# Patient Record
Sex: FEMALE | Race: WHITE | Employment: OTHER | URBAN - METROPOLITAN AREA
[De-identification: names, ages, dates, MRNs, and addresses within clinical notes are randomized per-mention and may not be internally consistent; named-entity substitution may affect disease eponyms.]

---

## 2021-08-11 ENCOUNTER — HOSPITAL ENCOUNTER (EMERGENCY)
Age: 56
Discharge: HOME | End: 2021-08-11

## 2021-08-11 ENCOUNTER — APPOINTMENT (OUTPATIENT)
Dept: GENERAL RADIOLOGY | Age: 56
End: 2021-08-11

## 2021-08-11 VITALS
RESPIRATION RATE: 16 BRPM | DIASTOLIC BLOOD PRESSURE: 91 MMHG | HEART RATE: 62 BPM | WEIGHT: 155 LBS | TEMPERATURE: 97.2 F | OXYGEN SATURATION: 98 % | SYSTOLIC BLOOD PRESSURE: 195 MMHG | HEIGHT: 62 IN | BODY MASS INDEX: 28.52 KG/M2

## 2021-08-11 DIAGNOSIS — S52.502A CLOSED FRACTURE OF DISTAL END OF LEFT RADIUS, UNSPECIFIED FRACTURE MORPHOLOGY, INITIAL ENCOUNTER: Primary | ICD-10-CM

## 2021-08-11 PROCEDURE — 99283 EMERGENCY DEPT VISIT LOW MDM: CPT

## 2021-08-11 PROCEDURE — 73110 X-RAY EXAM OF WRIST: CPT

## 2021-08-11 PROCEDURE — 29125 APPL SHORT ARM SPLINT STATIC: CPT

## 2021-08-11 ASSESSMENT — PAIN DESCRIPTION - ORIENTATION: ORIENTATION: LEFT

## 2021-08-11 ASSESSMENT — PAIN DESCRIPTION - LOCATION: LOCATION: WRIST

## 2021-08-11 ASSESSMENT — PAIN SCALES - GENERAL: PAINLEVEL_OUTOF10: 7

## 2021-08-16 ENCOUNTER — OFFICE VISIT (OUTPATIENT)
Dept: ORTHOPEDIC SURGERY | Age: 56
End: 2021-08-16

## 2021-08-16 VITALS — BODY MASS INDEX: 31.28 KG/M2 | TEMPERATURE: 98 F | WEIGHT: 170 LBS | HEIGHT: 62 IN

## 2021-08-16 DIAGNOSIS — S52.502A CLOSED FRACTURE OF DISTAL END OF LEFT RADIUS, UNSPECIFIED FRACTURE MORPHOLOGY, INITIAL ENCOUNTER: Primary | ICD-10-CM

## 2021-08-16 PROCEDURE — 99203 OFFICE O/P NEW LOW 30 MIN: CPT | Performed by: ORTHOPAEDIC SURGERY

## 2021-08-16 RX ORDER — CITALOPRAM 20 MG/1
TABLET ORAL
COMMUNITY
Start: 2021-07-09

## 2021-08-16 NOTE — PROGRESS NOTES
Emmie Navas is a 54 y.o. female, who presents   Chief Complaint   Patient presents with    Wrist Pain     left wrist DOI 8/11/21 wind ken zazueta and knocked her over       HPI[de-identified] Injury occurred 5 days ago and a fall. She fell to her left side and is unable to recall exact positioning. She ended up with a painful injury to her left wrist.  She went to [facility] ER where evaluation included x-rays which showed a fracture of the distal distal left radius. She was placed in a volar fiberglass splint. Allergies; medications; past medical, surgical, family, and social history; and problem list have been reviewed today and updated as indicated in this encounter - see below following Ortho specifics. Musculoskeletal: Skin condition gross neurovascular function is good in left upper extremity. Shoulder and elbow range of motion stability are good without pain. The left wrist was edematous with some discoloration from bruising. has little stiffness in her fingers because of the swelling. Mobilization was avoided because of the known injury and pain. Radiologic Studies: Imaging studies in multiple views shows a closed comminuted fracture of the distal left radius with slight dorsal shortening and neutralization of the volar tilt to perpendicular to the long axis on the lateral film. The ulnar styloid appears intact    ASSESSMENT:  Fay Cohen was seen today for wrist pain. Diagnoses and all orders for this visit:    Closed fracture of distal end of left radius, unspecified fracture morphology, initial encounter     Treatment alternatives were reviewed including medical and physical therapies, injections, and surgical options, expected risks benefits and likely outcome of each were discussed in detail, questions asked and answered and understood. We discussed the injury as well as physical findings and imaging results. This could heal with a good functional result.   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 Surgical treatments not felt to be necessary here for good result. PLAN: We will place her in a heat molded brace and follow-up in 1 week and andrae-ray to check position alignment. There is no problem list on file for this patient. Past Medical History:   Diagnosis Date    Hypertension        History reviewed. No pertinent surgical history. Current Outpatient Medications   Medication Sig Dispense Refill    citalopram (CELEXA) 20 MG tablet TAKE ONE TABLET BY MOUTH DAILY       No current facility-administered medications for this visit. Allergies   Allergen Reactions    Sulfa Antibiotics Hives       Social History     Socioeconomic History    Marital status:      Spouse name: None    Number of children: None    Years of education: None    Highest education level: None   Occupational History    None   Tobacco Use    Smoking status: Never Smoker    Smokeless tobacco: Never Used   Substance and Sexual Activity    Alcohol use: None    Drug use: None    Sexual activity: None   Other Topics Concern    None   Social History Narrative    None     Social Determinants of Health     Financial Resource Strain:     Difficulty of Paying Living Expenses:    Food Insecurity:     Worried About Running Out of Food in the Last Year:     Ran Out of Food in the Last Year:    Transportation Needs:     Lack of Transportation (Medical):      Lack of Transportation (Non-Medical):    Physical Activity:     Days of Exercise per Week:     Minutes of Exercise per Session:    Stress:     Feeling of Stress :    Social Connections:     Frequency of Communication with Friends and Family:     Frequency of Social Gatherings with Friends and Family:     Attends Evangelical Services:     Active Member of Clubs or Organizations:     Attends Club or Organization Meetings:     Marital Status:    Intimate Partner Violence:     Fear of Current or Ex-Partner:     Emotionally Abused:     Physically Abused:     Sexually Abused:        History reviewed. No pertinent family history. Review of Systems:   As follows except as previously noted in HPI:  Constitutional: Negative for chills, diaphoresis,  fever   Respiratory: Negative for cough, shortness of breath and wheezing. Cardiovascular: Negative for chest pain and palpitations. Neurological: Negative for dizziness, syncope,   GI / : abdominal pain or cramping  Musculoskeletal: see HPI       Objective:   Physical Exam   Constitutional: Oriented to person, place, and time. and appears well-developed and well-nourished. :   Head: Normocephalic and atraumatic. Neck: Neck supple. Eyes: EOM are normal.   Pulmonary/Chest: Effort normal.  No respiratory distress, no wheezes. Neurological: Alert and oriented to person  Skin: Skin is warm and dry. Kuldip Conti DO    8/16/21  11:50 AM    All reasonable efforts have been made to minimize the risk of errors that may occur in the use of voice recognition and other electronic means of charting.

## 2021-08-20 DIAGNOSIS — S52.502A CLOSED FRACTURE OF DISTAL END OF LEFT RADIUS, UNSPECIFIED FRACTURE MORPHOLOGY, INITIAL ENCOUNTER: Primary | ICD-10-CM

## 2021-08-23 ENCOUNTER — OFFICE VISIT (OUTPATIENT)
Dept: ORTHOPEDIC SURGERY | Age: 56
End: 2021-08-23

## 2021-08-23 VITALS — HEIGHT: 62 IN | TEMPERATURE: 98 F | WEIGHT: 170 LBS | BODY MASS INDEX: 31.28 KG/M2

## 2021-08-23 DIAGNOSIS — S52.502A CLOSED FRACTURE OF DISTAL END OF LEFT RADIUS, UNSPECIFIED FRACTURE MORPHOLOGY, INITIAL ENCOUNTER: Primary | ICD-10-CM

## 2021-08-23 PROCEDURE — 99213 OFFICE O/P EST LOW 20 MIN: CPT | Performed by: ORTHOPAEDIC SURGERY

## 2021-08-23 NOTE — PROGRESS NOTES
Chief Complaint:   Chief Complaint   Patient presents with    Wrist Pain     left wrist pain f/u doesnt hurt to much unless using it to often, more crampy feeling       Wilder Izaguirre is 12 days post injury to her left wrist.  She is in a brace. She has tried to clean it up a little bit. She notices most discomfort with trying to rotate her wrist and hand. 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 Allergies; medications; past medical, surgical, family, and social history; and problem list have been reviewed today and updated as indicated in this encounter seen below. Exam: Finger range of motion is good. She has most discomfort and difficulty with supination. This is not unexpected with this injury. Elbow range of motion is good. There is a small resolving bruise in the lateral elbow area. Radiographs: Imaging and 3 views of the left wrist shows a stable fracture pattern of the distal left radius with maintained alignment. The distal articular surface about perpendicular to the long axis on the lateral films. There is minimal shortening overall. ASSESSMENT:    There are no diagnoses linked to this encounter. PLAN: Continue with the brace. Clean instructions were given to [facility]. We will follow-up in 4 weeks and x-ray the wrist once again. No follow-ups on file. Current Outpatient Medications   Medication Sig Dispense Refill    citalopram (CELEXA) 20 MG tablet TAKE ONE TABLET BY MOUTH DAILY       No current facility-administered medications for this visit. There is no problem list on file for this patient. Past Medical History:   Diagnosis Date    Hypertension        No past surgical history on file.     Allergies   Allergen Reactions    Sulfa Antibiotics Hives       Social History     Socioeconomic History    Marital status:      Spouse name: Not on file    Number of children: Not on file    Years of education: Not on file    Highest education level: Not on file   Occupational History    Not on file   Tobacco Use    Smoking status: Never Smoker    Smokeless tobacco: Never Used   Substance and Sexual Activity    Alcohol use: Not on file    Drug use: Not on file    Sexual activity: Not on file   Other Topics Concern    Not on file   Social History Narrative    Not on file     Social Determinants of Health     Financial Resource Strain:     Difficulty of Paying Living Expenses:    Food Insecurity:     Worried About Running Out of Food in the Last Year:     [address] in the Last Year:    Transportation Needs:     Lack of Transportation (Medical):  Lack of Transportation (Non-Medical):    Physical Activity:     Days of Exercise per Week:     Minutes of Exercise per Session:    Stress:     Feeling of Stress :    Social Connections:     Frequency of Communication with Friends and Family:     Frequency of Social Gatherings with Friends and Family:     Attends Confucianist Services:     Active Member of Clubs or Organizations:     Attends Club or Organization Meetings:     Marital Status:    Intimate Partner Violence:     Fear of Current or Ex-Partner:     Emotionally Abused:     Physically Abused:     Sexually Abused:        Review of Systems  As follows except as previously noted in HPI:  Constitutional: Negative for chills, diaphoresis, fatigue, fever and unexpected weight change. Respiratory: Negative for cough, shortness of breath and wheezing. Cardiovascular: Negative for chest pain and palpitations. Neurological: Negative for dizziness, syncope, cephalgia. GI / : negative  Musculoskeletal: see HPI       Objective:   Physical Exam   Constitutional: Oriented to person, place, and time. and appears well-developed and well-nourished. :   Head: Normocephalic and atraumatic. Eyes: EOM are normal.   Neck: Neck supple. Cardiovascular: Normal rate and regular rhythm.     Pulmonary/Chest: Effort normal. No stridor. No respiratory distress, no wheezes. Abdominal:  No abnormal distension. Neurological: Alert and oriented to person, place, and time. Skin: Skin is warm and dry. Psychiatric: Normal mood and affect.  Behavior is normal. Thought content normal.    BLANCA Nixon,     8/23/21  12:00 PM

## 2021-09-17 DIAGNOSIS — S52.502A CLOSED FRACTURE OF DISTAL END OF LEFT RADIUS, UNSPECIFIED FRACTURE MORPHOLOGY, INITIAL ENCOUNTER: Primary | ICD-10-CM

## 2021-09-20 ENCOUNTER — OFFICE VISIT (OUTPATIENT)
Dept: ORTHOPEDIC SURGERY | Age: 56
End: 2021-09-20

## 2021-09-20 VITALS — WEIGHT: 170 LBS | TEMPERATURE: 98 F | BODY MASS INDEX: 31.28 KG/M2 | HEIGHT: 62 IN

## 2021-09-20 DIAGNOSIS — S52.502A CLOSED FRACTURE OF DISTAL END OF LEFT RADIUS, UNSPECIFIED FRACTURE MORPHOLOGY, INITIAL ENCOUNTER: Primary | ICD-10-CM

## 2021-09-20 PROCEDURE — 99213 OFFICE O/P EST LOW 20 MIN: CPT | Performed by: ORTHOPAEDIC SURGERY

## 2021-09-20 RX ORDER — VALACYCLOVIR HYDROCHLORIDE 500 MG/1
TABLET, FILM COATED ORAL
COMMUNITY
Start: 2021-09-05

## 2021-09-20 NOTE — PROGRESS NOTES
Chief Complaint:   Chief Complaint   Patient presents with    Wrist Pain     Left wrist pain follow up. Wrist is feeling better then previous visit. Some movements still bothering her. Yrn Alarcon is 6 weeks post injury to her left wrist.  She has been wearing her brace. She has some discomfort and limitations of motion. She has been moving a little bit. She is taken the brace off some. She has been working with the brace on she also sleeps with the brace on. Allergies; medications; past medical, surgical, family, and social history; and problem list have been reviewed today and updated as indicated in this encounter seen below. Exam: There is a slight amount prominence of the ulnar styloid left wrist.  There is some discomfort with range of motion which is quite limited in all planes. She is pliable when she relaxes. Radiographs: Imaging of the left wrist and 3 views shows decreased fracture distinction still suggesting progressive healing through the area. There is little widening of the distal radial ulnar joint. ASSESSMENT:    Florence Krishna was seen today for wrist pain. Diagnoses and all orders for this visit:    Closed fracture of distal end of left radius, unspecified fracture morphology, initial encounter  -     [facility]        PLAN: We will schedule physical therapy/occupational therapy to work on range of motion and function. She should gradually wean herself out of the brace. We will follow-up in about 3 weeks. Return in about 3 weeks (around 10/11/2021). Current Outpatient Medications   Medication Sig Dispense Refill    valACYclovir (VALTREX) 500 MG tablet TAKE ONE TABLET BY MOUTH DAILY      citalopram (CELEXA) 20 MG tablet TAKE ONE TABLET BY MOUTH DAILY       No current facility-administered medications for this visit. There is no problem list on file for this patient.       Past Medical History:   Diagnosis Date    person, place, and time. and appears well-developed and well-nourished. :   Head: Normocephalic and atraumatic. Eyes: EOM are normal.   Neck: Neck supple. Cardiovascular: Normal rate and regular rhythm. Pulmonary/Chest: Effort normal. No stridor. No respiratory distress, no wheezes. Abdominal:  No abnormal distension. Neurological: Alert and oriented to person, place, and time. Skin: Skin is warm and dry. Psychiatric: Normal mood and affect.  Behavior is normal. Thought content normal.    BLANCA Osorio DO    9/20/21  12:00 PM

## 2021-10-11 ENCOUNTER — OFFICE VISIT (OUTPATIENT)
Dept: ORTHOPEDIC SURGERY | Age: 56
End: 2021-10-11

## 2021-10-11 VITALS — WEIGHT: 170 LBS | BODY MASS INDEX: 31.28 KG/M2 | TEMPERATURE: 98 F | HEIGHT: 62 IN

## 2021-10-11 DIAGNOSIS — S52.502A CLOSED FRACTURE OF DISTAL END OF LEFT RADIUS, UNSPECIFIED FRACTURE MORPHOLOGY, INITIAL ENCOUNTER: Primary | ICD-10-CM

## 2021-10-11 PROCEDURE — 99213 OFFICE O/P EST LOW 20 MIN: CPT | Performed by: ORTHOPAEDIC SURGERY

## 2021-10-11 NOTE — PROGRESS NOTES
Antibiotics Hives       Social History     Socioeconomic History    Marital status:      Spouse name: None    Number of children: None    Years of education: None    Highest education level: None   Occupational History    None   Tobacco Use    Smoking status: Never Smoker    Smokeless tobacco: Never Used   Substance and Sexual Activity    Alcohol use: None    Drug use: None    Sexual activity: None   Other Topics Concern    None   Social History Narrative    None     Social Determinants of Health     Financial Resource Strain:     Difficulty of Paying Living Expenses:    Food Insecurity:     Worried About Running Out of Food in the Last Year:     Ran Out of Food in the Last Year:    Transportation Needs:     Lack of Transportation (Medical):  Lack of Transportation (Non-Medical):    Physical Activity:     Days of Exercise per Week:     Minutes of Exercise per Session:    Stress:     Feeling of Stress :    Social Connections:     Frequency of Communication with Friends and Family:     Frequency of Social Gatherings with Friends and Family:     Attends Episcopal Services:     Active Member of Clubs or Organizations:     Attends Club or Organization Meetings:     Marital Status:    Intimate Partner Violence:     Fear of Current or Ex-Partner:     Emotionally Abused:     Physically Abused:     Sexually Abused:        Review of Systems  As follows except as previously noted in HPI:  Constitutional: Negative for chills, diaphoresis, fatigue, fever and unexpected weight change. Respiratory: Negative for cough, shortness of breath and wheezing. Cardiovascular: Negative for chest pain and palpitations. Neurological: Negative for dizziness, syncope, cephalgia. GI / : negative  Musculoskeletal: see HPI       Objective:   Physical Exam   Constitutional: Oriented to person, place, and time. and appears well-developed and well-nourished. :   Head: Normocephalic and atraumatic. Eyes: EOM are normal.   Neck: Neck supple. Cardiovascular: Normal rate and regular rhythm. Pulmonary/Chest: Effort normal. No stridor. No respiratory distress, no wheezes. Abdominal:  No abnormal distension. Neurological: Alert and oriented to person, place, and time. Skin: Skin is warm and dry. Psychiatric: Normal mood and affect.  Behavior is normal. Thought content normal.    BLANCA Vann DO    10/11/21  11:29 AM

## 2021-10-27 ENCOUNTER — EVALUATION (OUTPATIENT)
Dept: OCCUPATIONAL THERAPY | Age: 56
End: 2021-10-27

## 2021-10-27 DIAGNOSIS — S52.502A CLOSED FRACTURE OF DISTAL END OF LEFT RADIUS, UNSPECIFIED FRACTURE MORPHOLOGY, INITIAL ENCOUNTER: Primary | ICD-10-CM

## 2021-10-27 PROCEDURE — 97165 OT EVAL LOW COMPLEX 30 MIN: CPT | Performed by: OCCUPATIONAL THERAPIST

## 2021-10-27 NOTE — PROGRESS NOTES
OCCUPATIONAL THERAPY INITIAL EVALUATION    [address] [facility]  [facility] OCCUPATIONAL THERAPY   [place] [address]  Dept: [id number] [address] [facility] Fax: [phone]    Date:  10/27/2021  Initial Evaluation Date: 10-27-21     Evaluating Therapist: Jenna Gagnon OT/L  [id number]    Patient Name:  Madison Key    :  1965    Restrictions/Precautions:   Activity as tolerated, Low fall risk  Diagnosis:  S52.502A (ICD-10-CM) - Closed fracture of distal end of left radius, unspecified fracture morphology, initial encounter       Date of Surgery/Injury: DOI 21/ no surgery required    Insurance/Certification information:  NA  Plan of care signed (Y/N): N  Visit# / total visits: 1 / up to 5 visits    Referring Practitioner:  Dr Jeremy Nichols Practitioner Orders: OT evaluate and treat    Assessment of current deficits   [] Functional mobility   [x] ADLs  [x] Strength   [] Cognition   [] Functional transfers   [x] IADLs  [] Safety Awareness  [] Endurance   [] Fine Motor Coordination  [] Balance  [] Vision/perception  [] Sensation    [] Gross Motor Coordination [x] ROM  [x] Pain   [] Edema    [] Scar Adhesion/Skin Integrity     OT PLAN OF CARE   OT POC based on physician orders, patient diagnosis and results of clinical assessment    Frequency/Xmgkyfle5n/ week x up to 5 weeks  Specific OT Treatment to include:     [x] Instruction in HEP                   Modalities:  [x] Therapeutic Exercise        [x] Ultrasound               [] Electrical Stimulation/Attended  [x] PROM/Stretching                    [x] Fluidotherapy          [x]  Paraffin                   [x] AAROM  [x] AROM                 [] Iontophoresis:   [] Tendon Glides                                               [] Neuromuscular Re-Ed            [] ADL/IADL re-training    [x] Therapeutic Activity       [x] Pain Management with/without modalities PRN                 [x] Manual Therapy                      [] Splinting                      [] Scar Management                   []Joint Protection/Training  []Ergonomics                             [] Joint Mobilization        [] Adaptive Equipment Assessment/Training                             [] Manual Edema Mobilization   [] Myofascial Release                 [] Energy Conservation/Work Simplification  [] GM/FM Coordination       [] Safety retraining/education per  individual diagnosis/goals  [] Desensitization       Patient Specific Goal: \" Do everything like normal\"                             GOALS (Long term same as Short term):  1) Patient will demonstrate good understanding of home program (exercises/activities/diagnosis/prognosis/goals) with good accuracy. 2) Patient will demonstrate increased active/passive range of motion of their left wrist to [facility] for ADL/IADL completion. 3) Patient will demonstrate increased /pinch strength of at least 10  in the left hand. 4) Patient to report decreased pain in their affected L distal upper extremity from 4/10 to 2/10 or less with resistive functional use. 5) Patient will report ADL functions as II using the left wrist/ hand more effectively. Past Medical History:   Past Medical History:   Diagnosis Date    Hypertension      Past Surgical History: No past surgical history on file. Reason for Referral: Pt presents with a Hx of left wrist injury from a fall on 8-11-21. Xrays showed acute fracture of the left distal radius. Conservative treatment was required with wearing a heat molded brace. Pt now presents for therapy with cc: of decreased wrist ROM, decreased tolerance for WB and decreased ability to lift.      Home Living: Lives alone  Prior Level of Function: independent    Cognition:   Alert/Oriented x3     IADL STATUS:   Ind Mod I Min A Mod A Max A Dep Other   Homemaking Responsibility  x        Shopping Responsibility:  x        Mode of Transportation: car x         Leisure & Hobbies: Work: [employer] x x     Difficulty shampooing     Comments: Pt is able to complete most light to moderate tasks with compensation and over use of the right arm. 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 Pt states she just pushes to do everything but states the wrist is stiff/ sore. ADL STATUS:   Ind Mod I Min A Mod A Max A Dep Other   Feeding: x         Grooming:  x        Bathing: x         UE Dressing: x         LE Dressing: x         Toileting: x         Transfers: x             Pain Level: 4 on scale of 1-10, aching, tight (pulling) and uncomfortable in the L radial wrist    UE Assessment: RHD    Left UE AROM: Exceptions to WNL    Eval (10-27-21)     Wrist flexion  0-40     Wrist extension  0-65      UD  0-25     Supination  0-55      Pronation  0-75             Sensation: L  Able To Sense (Y) / Unable to Sense (N)  SEMMES-KATIE Thumb 2nd Digit 3rd Digit  4th Digit  5th Digit    Normal Touch  Size: 2.83 x x x x x   Diminished Light Touch   Size: 3.61        Diminished Protective Sense  Size: 4.31        Loss of Protective Sense   Size: 4.56        Loss of Sensation  Size: 6.65          Dynamometer (setting 2):     Left: 33# average      Right: 59#    Pinch Meter:   Lateral: Left= 11#,Right= 13#      Coordination: WFL    QuickDASH Score: TBA    Intervention: Tx started today with a paraffin tx and followed with soft tissue mob to the wrist/ forearm. Wrist ROM started with attention to AROM and AAROM in all planes. The tightest areas are UD and wrist flexion. Pt is to continue ROM and light stretches at home. Plans are to progress ROM, stretches and strengthening with attention to HEP. Pt requests to limit sessions. Will advance as tolerated.       Eval Complexity: Low  Profile and History- interview, MD notes, xrays, ED notes  Assessment of Occupational Performance and Identification of Deficits- 5 performance deficits  Clinical Decision Making- no modifications in testing/ no co-morbiditiy    Rehab Potential:                                 [x] Good  [] Fair  [] Poor        Suggested Professional Referral:       [x] No  [] Yes:  Barriers to Goal Achievement[de-identified]          [x] No  [] Yes:  Domestic Concerns:                           [x] No  [] Yes:       Patient. Education:  [x] Plans/Goals, Risks/Benefits discussed  [x] Home exercise program  Method of Education: [x] Verbal  [x] Demo  [] Written  Comprehension of Education:  [x] Verbalizes understanding. [x] Demonstrates understanding. [] Needs Review. [] Demonstrates/verbalizes understanding of HEP/Ed previously given. Patient understands diagnosis/prognosis and consents to treatment, plan and goals:   [x] Yes    [] No       Time In: 1430          Time Out: 1530                      Timed Code Treatment Minutes: 60 minutes      CODE  Minutes  Units   31164 OT Eval Low 60 1   43295 OT Eval Medium     04569 OT Eval High     86555 Fluidotherapy     51601 Manual     04976 Therapeutic Ex     91472 Therapeutic Activity     73292 ADL/COMP Tech Train     78439 Neuromuscular Re-Ed     62924 OrthoManagementTraining     85687 Paraffin     73476 Electrical Stim - Attended     [id number] Iontophoresis     18484 Ultrasound      Other                Electronically signed by: Jarett Lagos OT/RAYMOND  [id number]      ANKITA Certification / Comments      Frequency/Duration 1x / week for up to 5 visits. Certification period From: 10-27-21  To: 1-27-22     I have reviewed the Plan of Care established for skilled therapy services and certify that the services are required and that they will be provided while the patient is under my care.      Physician's Comments/Revisions:           Physicians's Printed Name:  Dr Jesus Count includes the [facility]                                  Physician's Signature: Date:      Please review Patient's OT evaluation and if you agree sign/date and fax back to us at our [address] [facility] / [facility] Fax: [phone].  Thank you for your referral!

## 2021-10-29 PROBLEM — S52.502A CLOSED FRACTURE OF LEFT DISTAL RADIUS: Status: ACTIVE | Noted: 2021-10-29

## 2021-11-03 ENCOUNTER — TREATMENT (OUTPATIENT)
Dept: OCCUPATIONAL THERAPY | Age: 56
End: 2021-11-03

## 2021-11-03 DIAGNOSIS — S52.502A CLOSED FRACTURE OF DISTAL END OF LEFT RADIUS, UNSPECIFIED FRACTURE MORPHOLOGY, INITIAL ENCOUNTER: Primary | ICD-10-CM

## 2021-11-03 PROCEDURE — 97110 THERAPEUTIC EXERCISES: CPT | Performed by: OCCUPATIONAL THERAPIST

## 2021-11-03 PROCEDURE — 97018 PARAFFIN BATH THERAPY: CPT | Performed by: OCCUPATIONAL THERAPIST

## 2021-11-03 PROCEDURE — 97140 MANUAL THERAPY 1/> REGIONS: CPT | Performed by: OCCUPATIONAL THERAPIST

## 2021-11-03 NOTE — PROGRESS NOTES
[x]? Pain Management with/without modalities PRN                 [x]?  Manual Therapy                      []? Splinting                                   []? Scar Management                   []?Joint Protection/Training  []? Ergonomics                             []? Joint Mobilization                      []? Adaptive Equipment Assessment/Training                             []? Manual Edema Mobilization   []? Myofascial Release                 []? Energy Conservation/Work Simplification  []? GM/FM Coordination                []? Safety retraining/education per  individual diagnosis/goals  []? Desensitization        Patient Specific Goal: \" Do everything like normal\"                             GOALS (Long term same as Short term):  1) Patient will demonstrate good understanding of home program (exercises/activities/diagnosis/prognosis/goals) with good accuracy. 2) Patient will demonstrate increased active/passive range of motion of their left wrist to [facility] for ADL/IADL completion. 3) Patient will demonstrate increased /pinch strength of at least 10  in the left hand. 4) Patient to report decreased pain in their affected L distal upper extremity from 4/10 to 2/10 or less with resistive functional use. 5) Patient will report ADL functions as II using the left wrist/ hand more effectively.        Pain Level: 2-3 on scale of 1-10, tight (pulling) and uncomfortable     Subjective: \" I think it is getting a little better. \"   Objective:  Updated POC to be completed by last visit.     INTERVENTION: COMPLETED: SPECIFICS/COMMENTS:   Modality:     Paraffin Tx- L x 10 min to increase soft tissue elasticity and decrease joint stiffness   Fluidotherapy w/ AROM L wrist     AROM/ AAROM     Wrist AROM/ AAROM X  x - Wrist AROM all planes  -Juxaciser (4x)- challenging                  PROM/Stretching:     Wrist/ forearm PROM X  x - wrist PROM all planes  -forearm PROM        Manual techniques:     Soft tissue mob x - wrist/ volar forearm        Strengthening:     Wrist PREs x 1# wrist ext, flexion and deviation   Forearm PREs x - hammer ex w/ hand in top 1/3   Hand strengthening x - soft putty ex/ gripping, taffy pull, rolling and alternating pinch        Other:     HEP x Wrist AROM/ AAROM, Forearm rotation AROM, wrist PREs with 1#, hammer ex, putty ex          Assessment/Comments: Pt is making Good progress toward stated plan of care. ROM, stretches and light strengthening completed today. Mild discomfort reported with forceful gripping and weighted forearm  Rotation. Light strengthening added to HEP. Will continue.     -Rehab Potential: Good  -Requires OT Follow Up: Yes  Time In:1430            Time Out: 5431             Visit #: 2    Treatment Charges: Mins Units   Modalities:paraffin 10 1   Ther Exercise 15 1   Manual Therapy 15 1   Thera Activities 5    ADL/Home Mgt      Neuro Re-education     Group Therapy     Non-Billable Service Time     Other     Total Time/Units 45 3     -Response to Treatment: Pt is motivated to get better. Goals: Goals for pt can be seen on initial eval occurring on 10-27-21    Plan:   [x]  Continue Plan of care: Continue ROM, stretches and strengthening as tolerated. Pt education continues at each visit to obtain maximum benefits from skilled OT intervention.   []  Alter Plan of care:   []  Discharge:      Sofia Butcher OT /RAYMOND [id number]

## 2021-12-01 ENCOUNTER — TREATMENT (OUTPATIENT)
Dept: OCCUPATIONAL THERAPY | Age: 56
End: 2021-12-01

## 2021-12-01 DIAGNOSIS — S52.502A CLOSED FRACTURE OF DISTAL END OF LEFT RADIUS, UNSPECIFIED FRACTURE MORPHOLOGY, INITIAL ENCOUNTER: Primary | ICD-10-CM

## 2021-12-01 PROCEDURE — 97140 MANUAL THERAPY 1/> REGIONS: CPT | Performed by: OCCUPATIONAL THERAPIST

## 2021-12-01 PROCEDURE — 97110 THERAPEUTIC EXERCISES: CPT | Performed by: OCCUPATIONAL THERAPIST

## 2021-12-01 PROCEDURE — 97018 PARAFFIN BATH THERAPY: CPT | Performed by: OCCUPATIONAL THERAPIST

## 2021-12-01 NOTE — PROGRESS NOTES
[facility] OCCUPATIONAL THERAPY   [address]  [facility] [address]  Dept: [address] OT Fax: [phone]    OCCUPATIONAL THERAPY PROGRESS NOTE    Date:  2021  Initial Evaluation Date: 10-27-21    Patient Name:  Carmel Garvey    :  1965    Restrictions/Precautions: Activity as tolerated, Low fall risk  Diagnosis:  S52.502A (ICD-10-CM) - Closed fracture of distal end of left radius, unspecified fracture morphology, initial encounter                                                      Date of Surgery/Injury: DOI 21/ no surgery required     Insurance/Certification information:  NA  Plan of care signed (Y/N): Y (thru 22)  Visit# / total visits: 3 / up to 5 visits     Referring Practitioner:  Dr Dianne Scott Practitioner Orders: OT evaluate and treat     Assessment of current deficits   []? Functional mobility             [x]? ADLs          [x]? Strength                  []? Cognition   []? Functional transfers           [x]? IADLs         []? Safety Awareness  []? Endurance   []? Fine Motor Coordination    []? Balance      []? Vision/perception   []? Sensation     []? Gross Motor Coordination [x]? ROM           [x]? Pain                        []? Edema          []? Scar Adhesion/Skin Integrity      OT PLAN OF CARE   OT POC based on physician orders, patient diagnosis and results of clinical assessment     Frequency/Wuceeyzb6z/ week x up to 5 weeks  Specific OT Treatment to include:      [x]? Instruction in HEP                   Modalities:  [x]?  Therapeutic Exercise                 [x]? Ultrasound               []? Electrical Stimulation/Attended  [x]? PROM/Stretching                    [x]? Fluidotherapy          [x]?   Paraffin                   [x]? AAROM  [x]?  AROM                 []? Iontophoresis:   [x]? Arvjosue Raja                                       []? Neuromuscular Re-Ed            []? ADL/IADL re-training    [x]?  Therapeutic Activity [x]? Pain Management with/without modalities PRN                 [x]?  Manual Therapy                      []? Splinting                                   []? Scar Management                   []?Joint Protection/Training  []? Ergonomics                             []? Joint Mobilization                      []? Adaptive Equipment Assessment/Training                             []? Manual Edema Mobilization   []? Myofascial Release                 []? Energy Conservation/Work Simplification  []? GM/FM Coordination                []? Safety retraining/education per  individual diagnosis/goals  []? Desensitization        Patient Specific Goal: \" Do everything like normal\"                             SXZZH (Long term same as Short term): update 12-1-21  1) Patient will demonstrate good understanding of home program (exercises/activities/diagnosis/prognosis/goals) with good accuracy. (ongoing for ROM, stretches, edema control and light wrist/ forearm/ hand strengthening)  2) Patient will demonstrate increased active/passive range of motion of their left wrist to [facility] for ADL/IADL completion. ( progress noted)  3) Patient will demonstrate increased /pinch strength of at least 10  in the left hand.   (progress noted)  4) Patient to report decreased pain in their affected L distal upper extremity from 4/10 to 2/10 or less with resistive functional use. (progress noted- pain averages 2-3/10 with resistive tasks)  5) Patient will report ADL functions as II using the left wrist/ hand more effectively. (progress noted- pt is using the L wrist often/ issues remain with moderate to heavy lifting , WB onto the wrist and with cutting hair while forearm is supinated)     Pain Level: 2 on scale of 1-10, tight (pulling) and uncomfortable     Subjective: Pt presents with no new complaints. Objective:  Updated POC to be completed by last visit.     INTERVENTION: COMPLETED: SPECIFICS/COMMENTS:   Modality: Paraffin Tx- L x 10 min to increase soft tissue elasticity and decrease joint stiffness   Fluidotherapy w/ AROM L wrist     AROM/ AAROM     Wrist AROM/ AAROM X   - Wrist AROM all planes  -Juxaciser                  PROM/Stretching:     Wrist/ forearm PROM X  x - wrist PROM all planes  - forearm PROM        Manual techniques:     Soft tissue mob x - wrist/ volar forearm        Strengthening:     Wrist/ Forearm PREs X  X  x - hammer ex w/ hand in top 1/3  - weighted dowel 2# deviations and cirtcumduction  - shane bar red/ 10# wrist flexion/ ext 15x each, bending palm up/ palm down   Hand strengthening   x - soft putty ex/ gripping, taffy pull, rolling and alternating pinch  - digiflex red/ 3# composite 20x/ each digit 10x        Other:     HEP x Wrist AROM/ AAROM, Forearm rotation AROM, wrist PREs with 1#, hammer ex, putty ex          Assessment/Comments: Pt is making Good progress toward stated plan of care. ROM, stretches and strengthening continued as tolerated. Overall swelling in the wrist is improving. The main ROM limitation is with UD and wrist flexion. Pt feels she is getting better and states she does exercise at times. Will continue. Left UE AROM: Exceptions to WNL    Eval (10-27-21)           12-1-21     Wrist flexion  0-40   0-55     Wrist extension  0-65   0-75      UD  0-25   0-38 (R-60)     Supination  0-55   0-65      Pronation  0-75  0-90                    -Rehab Potential: Good  -Requires OT Follow Up: Yes  Time In:  1005         Time Out: 1045             Visit #: 3    Treatment Charges: Mins Units   Modalities:paraffin 10 1   Ther Exercise 15 1   Manual Therapy 15 1   Thera Activities     ADL/Home Mgt      Neuro Re-education     Group Therapy     Non-Billable Service Time     Other     Total Time/Units 40 3     -Response to Treatment: Pt is pleased with her progress but stated she still doesn't trust the left wrist for lifting things.    Goals: Goals for pt can be seen on initial eval occurring on 10-27-21    Plan:   [x]  Continue Plan of care: Continue OT for 1 more session. Continue ROM, stretches and strengthening as tolerated. Pt education continues at each visit to obtain maximum benefits from skilled OT intervention.   []  Alter Plan of care:   []  Discharge:      Marcello Douglas OT /L [id number]

## 2022-05-03 NOTE — ED PROVIDER NOTES
[facility]    HPI:  8/11/21, Time: 2:56 PM EDT         Franco Han is a 54 y.o. female presenting to the ED for left wrist pain, beginning just prior to arrival after a fall. The complaint has been persistent, moderate in severity, and worsened by movement of left wrist.  Patient reports that she was outside today when the wind blew her over. She is unsure if she caught herself with her hand or if she fell onto her wrist however she did notice pain immediately after the fall. No previous injuries to this area. Pain does not radiate. Denies any numbness or tingling to the upper extremities. Patient's been afebrile without recent travel or sick contacts. Patient denies all other symptoms at this time. Review of Systems:   A complete review of systems was performed and pertinent positives and negatives are stated within HPI, all other systems reviewed and are negative.          --------------------------------------------- PAST HISTORY ---------------------------------------------  Past Medical History:  has a past medical history of Hypertension. Past Surgical History:  has no past surgical history on file. Social History:  reports that she has never smoked. She has never used smokeless tobacco.    Family History: family history is not on file. The patients home medications have been reviewed. Allergies: Sulfa antibiotics    -------------------------------------------------- RESULTS -------------------------------------------------  All laboratory and radiology results have been personally reviewed by myself   LABS:  No results found for this visit on 08/11/21. RADIOLOGY:  Interpreted by Radiologist.  XR WRIST LEFT (MIN 3 VIEWS)   Final Result   Acute fracture of the distal radius. ------------------------- NURSING NOTES AND VITALS REVIEWED ---------------------------   The nursing notes within the ED encounter and vital signs as below have been reviewed.    BP (!) 195/91 Pulse 62   Temp 97.2 °F (36.2 °C)   Resp 16   Ht 5' 2\" (1.575 m)   Wt 155 lb (70.3 kg)   SpO2 98%   BMI 28.35 kg/m²   Oxygen Saturation Interpretation: Normal      ---------------------------------------------------PHYSICAL EXAM--------------------------------------      Constitutional/General: Alert and oriented x3, well appearing, non toxic in NAD  Head: Normocephalic and atraumatic  Eyes: PERRL, EOMI  Mouth: Oropharynx clear, handling secretions, no trismus  Neck: Supple, full ROM,   Pulmonary: Lungs clear to auscultation bilaterally, no wheezes, rales, or rhonchi. Not in respiratory distress  Cardiovascular:  Regular rate and rhythm, no murmurs, gallops, or rubs. 2+ distal pulses  Abdomen: Soft, non tender, non distended,   Extremities: Moves all extremities x 4. Warm and well perfused. Generalized left wrist tenderness to palpation. Decreased ROM of left wrist secondary to pain. Skin: warm and dry without rash  Neurologic: GCS 15,  Psych: Normal Affect      ------------------------------ ED COURSE/MEDICAL DECISION MAKING----------------------  Medications - No data to display      ED COURSE:  ED Course as of Aug 11 1643   Wed Aug 11, 2021   1459 Patient declines pain medication at this time. [MS]   [id number] Reassessed patient. Remained stable neurovascularly intact. Discussed results with the patient and her friend. Acute distal radial fracture noted. Patient is neurovascularly intact, nontoxic appearing, and in no acute distress. Advised follow-up with orthopedics for further evaluation and treatment. Return to the ED with any new or worsening symptoms. Patient and her friend voiced understanding are agreeable to the above treatment plan. [MS]      ED Course User Index  [MS] Roel Sosa [place]       Medical Decision Making:    See ED course above. Counseling:    The emergency provider has spoken with the patient and friend and discussed todays results, in addition to providing specific details for the plan of care and counseling regarding the diagnosis and prognosis. Questions are answered at this time and they are agreeable with the plan.      --------------------------------- IMPRESSION AND DISPOSITION ---------------------------------    IMPRESSION  1. Closed fracture of distal end of left radius, unspecified fracture morphology, initial encounter        DISPOSITION  Disposition: Discharge to home  Patient condition is stable      NOTE: This report was transcribed using voice recognition software.  Every effort was made to ensure accuracy; however, inadvertent computerized transcription errors may be present        Tez Haynes  08/11/21 [id number] Counseling:  I discussed with the patient the risks of imiquimod including but not limited to erythema, scaling, itching, weeping, crusting, and pain.  Patient understands that the inflammatory response to imiquimod is variable from person to person and was educated regarded proper titration schedule.  If flu-like symptoms develop, patient knows to discontinue the medication and contact us.